# Patient Record
Sex: FEMALE | Race: WHITE | NOT HISPANIC OR LATINO | URBAN - NONMETROPOLITAN AREA
[De-identification: names, ages, dates, MRNs, and addresses within clinical notes are randomized per-mention and may not be internally consistent; named-entity substitution may affect disease eponyms.]

---

## 2017-02-13 ENCOUNTER — OFFICE VISIT (OUTPATIENT)
Dept: OBSTETRICS AND GYNECOLOGY | Facility: CLINIC | Age: 22
End: 2017-02-13

## 2017-02-13 VITALS
WEIGHT: 151 LBS | HEIGHT: 67 IN | DIASTOLIC BLOOD PRESSURE: 70 MMHG | BODY MASS INDEX: 23.7 KG/M2 | SYSTOLIC BLOOD PRESSURE: 122 MMHG

## 2017-02-13 DIAGNOSIS — Z12.4 SCREENING FOR MALIGNANT NEOPLASM OF CERVIX: ICD-10-CM

## 2017-02-13 DIAGNOSIS — Z30.011 ENCOUNTER FOR INITIAL PRESCRIPTION OF CONTRACEPTIVE PILLS: ICD-10-CM

## 2017-02-13 DIAGNOSIS — Z01.419 ENCOUNTER FOR GYNECOLOGICAL EXAMINATION WITHOUT ABNORMAL FINDING: Primary | ICD-10-CM

## 2017-02-13 PROCEDURE — 99385 PREV VISIT NEW AGE 18-39: CPT | Performed by: PHYSICIAN ASSISTANT

## 2017-02-13 RX ORDER — SUMATRIPTAN 100 MG/1
100 TABLET, FILM COATED ORAL ONCE AS NEEDED
COMMUNITY

## 2017-02-13 RX ORDER — LEVOTHYROXINE SODIUM 112 UG/1
112 TABLET ORAL DAILY
COMMUNITY

## 2017-02-13 RX ORDER — NORETHINDRONE ACETATE AND ETHINYL ESTRADIOL 1MG-20(21)
1 KIT ORAL DAILY
Qty: 28 TABLET | Refills: 12 | Status: SHIPPED | OUTPATIENT
Start: 2017-02-13 | End: 2018-02-13

## 2017-02-13 RX ORDER — ANTIARTHRITIC COMBINATION NO.2 900 MG
TABLET ORAL
COMMUNITY

## 2017-02-13 NOTE — PROGRESS NOTES
Subjective   Chief Complaint   Patient presents with   • Gynecologic Exam   • Contraception     Arabella Marmolejo is a 21 y.o. year old  presenting to be seen for her annual exam.   She is a new patient today  She is getting  in  and desires contraception  She is currently using condoms  Menses are regular q 27 days  LMP 17    Past Medical History   Diagnosis Date   • Anxiety    • Disease of thyroid gland         Current Outpatient Prescriptions:   •  Biotin 5000 MCG tablet, Take  by mouth., Disp: , Rfl:   •  levothyroxine (SYNTHROID, LEVOTHROID) 112 MCG tablet, Take 112 mcg by mouth Daily., Disp: , Rfl:   •  Multiple Vitamins-Minerals (MULTIVITAMIN ADULT PO), Take  by mouth., Disp: , Rfl:   •  Probiotic Product (PROBIOTIC ADVANCED PO), Take  by mouth., Disp: , Rfl:   •  sertraline (ZOLOFT) 50 MG tablet, Take 50 mg by mouth Daily., Disp: , Rfl:   •  SUMAtriptan (IMITREX) 100 MG tablet, Take 100 mg by mouth 1 (One) Time As Needed for migraine., Disp: , Rfl:   •  VITAMIN A PO, Take  by mouth., Disp: , Rfl:   •  Zinc Acetate, Oral, (ZINC ACETATE PO), Take  by mouth., Disp: , Rfl:   •  norethindrone-ethinyl estradiol (JUNEL FE 1/20) 1-20 MG-MCG per tablet, Take 1 tablet by mouth Daily., Disp: 28 tablet, Rfl: 12   No Known Allergies   Past Surgical History   Procedure Laterality Date   • [place] tooth extraction     • Other surgical history       broken arm , arm nerver repair       Social History     Social History   • Marital status: Single     Spouse name: N/A   • Number of children: N/A   • Years of education: N/A     Occupational History   • Not on file.     Social History Main Topics   • Smoking status: Never Smoker   • Smokeless tobacco: Not on file   • Alcohol use No   • Drug use: No   • Sexual activity: Yes     Birth control/ protection: None     Other Topics Concern   • Not on file     Social History Narrative   • No narrative on file      Family History   Problem  "Relation Age of Onset   • Hyperlipidemia Father    • Diabetes Mother    • Hyperlipidemia Mother    • Diabetes Paternal Grandfather    • Hyperlipidemia Paternal Grandfather    • Diabetes Paternal Grandmother    • Hyperlipidemia Paternal Grandmother    • Hyperlipidemia Maternal Grandmother    • Diabetes Maternal Grandfather    • Hyperlipidemia Maternal Grandfather        The following portions of the patient's history were reviewed and updated as appropriate:problem list, current medications, allergies, past family history, past medical history, past social history and past surgical history.    Review of Systems   Constitutional: Negative.    HENT: Positive for congestion.    Respiratory: Positive for cough.    Gastrointestinal: Negative.    Genitourinary: Negative.            Objective   Visit Vitals   • /70   • Ht 67\" (170.2 cm)   • Wt 151 lb (68.5 kg)   • LMP 01/17/2017   • BMI 23.65 kg/m2       Physical Exam   Constitutional: She appears well-developed and well-nourished.   Pulmonary/Chest: Effort normal and breath sounds normal. Right breast exhibits no inverted nipple, no mass, no nipple discharge, no skin change and no tenderness. Left breast exhibits no inverted nipple, no mass, no nipple discharge, no skin change and no tenderness. Breasts are symmetrical.   Abdominal: Soft. She exhibits no distension and no mass. There is no tenderness.   Genitourinary: Vagina normal and uterus normal. There is no rash or tenderness on the right labia. There is no rash or tenderness on the left labia. Cervix exhibits no motion tenderness and no discharge. Right adnexum displays no mass and no tenderness. Left adnexum displays no mass and no tenderness.   Skin: Skin is warm and dry.   Psychiatric: She has a normal mood and affect. Her behavior is normal.            Assessment /Plan      Arabella was seen today for gynecologic exam and contraception.    Diagnoses and all orders for this visit:    Encounter for " gynecological examination without abnormal finding    Screening for malignant neoplasm of cervix  -     Liquid-based Pap Smear, Screening    Encounter for initial prescription of contraceptive pills    Other orders  -     norethindrone-ethinyl estradiol (JUNEL FE 1/20) 1-20 MG-MCG per tablet; Take 1 tablet by mouth Daily.                 This note was electronically signed.    Evelia Tatum PA-C   February 13, 2017

## 2017-05-17 ENCOUNTER — TELEPHONE (OUTPATIENT)
Dept: OBSTETRICS AND GYNECOLOGY | Facility: CLINIC | Age: 22
End: 2017-05-17

## 2017-05-17 RX ORDER — DESOGESTREL AND ETHINYL ESTRADIOL 0.15-0.03
1 KIT ORAL DAILY
Qty: 28 TABLET | Refills: 8 | Status: SHIPPED | OUTPATIENT
Start: 2017-05-17 | End: 2018-05-17